# Patient Record
Sex: FEMALE | Race: WHITE | Employment: FULL TIME
[De-identification: names, ages, dates, MRNs, and addresses within clinical notes are randomized per-mention and may not be internally consistent; named-entity substitution may affect disease eponyms.]

---

## 2024-01-22 ENCOUNTER — APPOINTMENT (OUTPATIENT)
Facility: HOSPITAL | Age: 64
End: 2024-01-22
Payer: COMMERCIAL

## 2024-01-22 ENCOUNTER — HOSPITAL ENCOUNTER (EMERGENCY)
Facility: HOSPITAL | Age: 64
Discharge: HOME | End: 2024-01-23
Attending: STUDENT IN AN ORGANIZED HEALTH CARE EDUCATION/TRAINING PROGRAM
Payer: COMMERCIAL

## 2024-01-22 DIAGNOSIS — S43.015A ANTERIOR DISLOCATION OF LEFT SHOULDER, INITIAL ENCOUNTER: ICD-10-CM

## 2024-01-22 DIAGNOSIS — M21.822 HILL SACHS DEFORMITY, LEFT: ICD-10-CM
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
## 2024-01-22 PROCEDURE — 6370000000 HC RX 637 (ALT 250 FOR IP): Performed by: STUDENT IN AN ORGANIZED HEALTH CARE EDUCATION/TRAINING PROGRAM

## 2024-01-22 PROCEDURE — 70450 CT HEAD/BRAIN W/O DYE: CPT

## 2024-01-22 PROCEDURE — 23655 CLTX SHO DSLC W/MNPJ W/ANES: CPT

## 2024-01-22 PROCEDURE — 72125 CT NECK SPINE W/O DYE: CPT

## 2024-01-22 PROCEDURE — 96374 THER/PROPH/DIAG INJ IV PUSH: CPT

## 2024-01-22 PROCEDURE — 23650 CLTX SHO DSLC W/MNPJ WO ANES: CPT

## 2024-01-22 PROCEDURE — 96375 TX/PRO/DX INJ NEW DRUG ADDON: CPT

## 2024-01-22 PROCEDURE — 99285 EMERGENCY DEPT VISIT HI MDM: CPT

## 2024-01-22 PROCEDURE — 73030 X-RAY EXAM OF SHOULDER: CPT

## 2024-01-22 RX ORDER — ACETAMINOPHEN 500 MG
1000 TABLET ORAL
Status: COMPLETED | OUTPATIENT
Start: 2024-01-22 | End: 2024-01-22

## 2024-01-22 RX ORDER — OXYCODONE HYDROCHLORIDE 5 MG/1
5 TABLET ORAL
Status: COMPLETED | OUTPATIENT
Start: 2024-01-22 | End: 2024-01-22

## 2024-01-22 RX ADMIN — ACETAMINOPHEN 1000 MG: 500 TABLET ORAL at 22:44

## 2024-01-22 RX ADMIN — OXYCODONE 5 MG: 5 TABLET ORAL at 22:44

## 2024-01-22 ASSESSMENT — PAIN - FUNCTIONAL ASSESSMENT: PAIN_FUNCTIONAL_ASSESSMENT: 0-10

## 2024-01-22 ASSESSMENT — PAIN SCALES - GENERAL: PAINLEVEL_OUTOF10: 8

## 2024-01-22 ASSESSMENT — PAIN DESCRIPTION - LOCATION: LOCATION: SHOULDER

## 2024-01-22 ASSESSMENT — PAIN DESCRIPTION - DESCRIPTORS: DESCRIPTORS: NUMBNESS;SHARP;SHOOTING

## 2024-01-22 ASSESSMENT — PAIN DESCRIPTION - ORIENTATION: ORIENTATION: LEFT

## 2024-01-23 ENCOUNTER — APPOINTMENT (OUTPATIENT)
Facility: HOSPITAL | Age: 64
End: 2024-01-23
Payer: COMMERCIAL

## 2024-01-23 VITALS
BODY MASS INDEX: 19.99 KG/M2 | OXYGEN SATURATION: 98 % | DIASTOLIC BLOOD PRESSURE: 64 MMHG | RESPIRATION RATE: 16 BRPM | SYSTOLIC BLOOD PRESSURE: 116 MMHG | TEMPERATURE: 97.4 F | WEIGHT: 120 LBS | HEIGHT: 65 IN | HEART RATE: 81 BPM

## 2024-01-23 PROCEDURE — 73030 X-RAY EXAM OF SHOULDER: CPT

## 2024-01-23 PROCEDURE — 6360000002 HC RX W HCPCS: Performed by: STUDENT IN AN ORGANIZED HEALTH CARE EDUCATION/TRAINING PROGRAM

## 2024-01-23 RX ORDER — MIDAZOLAM HYDROCHLORIDE 2 MG/2ML
2 INJECTION, SOLUTION INTRAMUSCULAR; INTRAVENOUS ONCE
Status: COMPLETED | OUTPATIENT
Start: 2024-01-23 | End: 2024-01-23

## 2024-01-23 RX ADMIN — HYDROMORPHONE HYDROCHLORIDE 0.5 MG: 1 INJECTION, SOLUTION INTRAMUSCULAR; INTRAVENOUS; SUBCUTANEOUS at 01:51

## 2024-01-23 RX ADMIN — MIDAZOLAM 2 MG: 1 INJECTION INTRAMUSCULAR; INTRAVENOUS at 01:51

## 2024-01-23 NOTE — ED PROVIDER NOTES
[facility] EMERGENCY DEPT  EMERGENCY DEPARTMENT ENCOUNTER      Pt Name: Elizabeth Bustamante  MRN: [id number]  Birthdate 1960  Date of evaluation: 1/22/2024  Provider: Trinh Avilez DO    CHIEF COMPLAINT       Chief Complaint   Patient presents with    Fall    Shoulder Injury       PMH No past medical history on file.      MDM:   Vitals:    Vitals:    01/23/24 0230   BP: 116/64   Pulse: 81   Resp: 16   Temp:    SpO2: 98%           This is a 63 y.o. female with no significant pmhx  who presents today for cc of fall.  Patient states that she was going to walk down the stairs and she misplaced her foot, slipped and fell hitting mostly her left shoulder down a flight of stairs.  She denied any prodrome or presyncope.  Denies hitting her head, no loss of consciousness.  Complains of 8 out of 10 pain to the left shoulder.  Aching and nonradiating.    On arrival VS stable.   Physical Exam  General: Alert, no acute distress  HEENT: Normocephalic, atraumatic. EOMI, moist oral mucosa, no conjunctival injection  Neck: ROM normal, supple, no midline C/T/L-spine tenderness  Cardio: Heart regular rate and rhythm, cap refill <2seconds  Lungs: No respiratory distress, no wheezing, CTAB  Abdomen: Soft, nontender  MSK: Left shoulder ROM decreased secondary to pain, humeral head palpable under the glenoid process.  2+ radial pulses bilaterally.  No tenderness to the bilateral wrists or elbows.  Skin: Warm, dry, no rash  Neuro: No focal neurodeficits, Aox3    Patient did sustain quite a long fall down a full flight of stairs.  CT obtained to evaluate for intracranial process and was negative.  X-ray of the left shoulder shows anterior dislocation.  Dislocation reduction performed as described below, patient tolerated well and repeat x-ray showed stable positioning.  Sling applied and patient will follow-up with orthopedics.    Diagnosis is fall, left shoulder dislocation with hill sachs deformity. Disposition is Discharge with PCP and

## 2024-01-23 NOTE — ED TRIAGE NOTES
Pt arrives to ED c/o falling down 13 stairs. Pt's only complaint is L shoulder pain/possible dislocation. Denies LOC, neck pain, back pain, numbness, tingling, head injury. Pt reports it was a mechanical fall. Provider in triage